# Patient Record
Sex: MALE | URBAN - METROPOLITAN AREA
[De-identification: names, ages, dates, MRNs, and addresses within clinical notes are randomized per-mention and may not be internally consistent; named-entity substitution may affect disease eponyms.]

---

## 2018-08-16 ENCOUNTER — NURSE NAVIGATOR (OUTPATIENT)
Dept: OTHER | Age: 63
End: 2018-08-16

## 2018-08-16 NOTE — NURSE NAVIGATOR
Mr. Kristina Zendejas has relocated to [place] per a phone conversation with him today.       Prema Oquendo, BSN, RN, OCN  [facility] Nurse Navigator

## 2018-09-13 NOTE — NURSE NAVIGATOR
Mr. Cheo Francisco has relocated to [place].       Torito Cleveland, CHARLESN, RN, OCN  [facility] Nurse Navigator